# Patient Record
Sex: MALE | Race: WHITE | URBAN - METROPOLITAN AREA
[De-identification: names, ages, dates, MRNs, and addresses within clinical notes are randomized per-mention and may not be internally consistent; named-entity substitution may affect disease eponyms.]

---

## 2020-11-09 ENCOUNTER — OFFICE VISIT (OUTPATIENT)
Dept: URBAN - METROPOLITAN AREA CLINIC 91 | Facility: CLINIC | Age: 70
End: 2020-11-09
Payer: COMMERCIAL

## 2020-11-09 PROCEDURE — 92012 INTRM OPH EXAM EST PATIENT: CPT | Performed by: SPECIALIST

## 2020-11-09 ASSESSMENT — INTRAOCULAR PRESSURE
OS: 15
OD: 16

## 2020-11-09 NOTE — IMPRESSION/PLAN
Impression: Puckering of macula, right eye: H35.371. Plan: I discussed epiretinal membrane. I will obtain baseline testing at this time. Patient advised to monitor vision closely, and to call our office immediately if change is noted. Continue PF 1% QD OD. RV in 3-4 months DFE with OCTm.

## 2021-01-04 ENCOUNTER — OFFICE VISIT (OUTPATIENT)
Dept: URBAN - METROPOLITAN AREA CLINIC 91 | Facility: CLINIC | Age: 71
End: 2021-01-04
Payer: COMMERCIAL

## 2021-01-04 DIAGNOSIS — R42 DIZZINESS: ICD-10-CM

## 2021-01-04 DIAGNOSIS — H26.493 OTHER SECONDARY CATARACT, BILATERAL: ICD-10-CM

## 2021-01-04 PROCEDURE — 99213 OFFICE O/P EST LOW 20 MIN: CPT | Performed by: SPECIALIST

## 2021-01-04 ASSESSMENT — INTRAOCULAR PRESSURE
OS: 16
OD: 17

## 2021-01-04 NOTE — IMPRESSION/PLAN
Impression: Dizziness: R42. Plan: Discussed with patient that he is 20/20, no sign of diplopia and recommended to be refracted again. Will consider to refer pt Neuro Opthalmology, pt has requested to wait as of now. No surgery recommended at this time.

## 2021-01-04 NOTE — IMPRESSION/PLAN
Impression: Puckering of macula, right eye: H35.371. Plan: I discussed epiretinal membrane  OD. I will obtain baseline testing at this time. Patient advised to monitor vision closely, and to call our office immediately if change is noted. Return with Dr. Yessy Lin in 3 months for a follow up and OCTm with Dr. Yessy Lin.

## 2021-02-16 ENCOUNTER — OFFICE VISIT (OUTPATIENT)
Dept: URBAN - METROPOLITAN AREA CLINIC 91 | Facility: CLINIC | Age: 71
End: 2021-02-16
Payer: COMMERCIAL

## 2021-02-16 DIAGNOSIS — H25.12 AGE-RELATED NUCLEAR CATARACT, LEFT EYE: ICD-10-CM

## 2021-02-16 PROCEDURE — 99213 OFFICE O/P EST LOW 20 MIN: CPT | Performed by: SPECIALIST

## 2021-02-16 ASSESSMENT — INTRAOCULAR PRESSURE
OS: 15
OD: 11

## 2021-02-16 NOTE — IMPRESSION/PLAN
Impression: Puckering of macula, right eye: H35.371. Plan: I discussed epiretinal membrane right eye. I will obtain baseline testing at this time. Patient advised to monitor vision closely, and to call our office immediately if change is noted. RV In 6 months short follow up with OCTm.

## 2021-05-03 ENCOUNTER — OFFICE VISIT (OUTPATIENT)
Dept: URBAN - METROPOLITAN AREA CLINIC 91 | Facility: CLINIC | Age: 71
End: 2021-05-03
Payer: COMMERCIAL

## 2021-05-03 PROCEDURE — 99212 OFFICE O/P EST SF 10 MIN: CPT | Performed by: SPECIALIST

## 2021-05-03 PROCEDURE — 92134 CPTRZ OPH DX IMG PST SGM RTA: CPT | Performed by: SPECIALIST

## 2021-05-03 ASSESSMENT — INTRAOCULAR PRESSURE
OS: 14
OD: 14

## 2021-05-03 NOTE — IMPRESSION/PLAN
Impression: Puckering of macula, right eye: H35.371. Plan: OCTm stable. Patient is followed regularly by a retina specialist. 

I discussed epiretinal membrane OD. I will obtain baseline testing at this time. Patient advised to monitor vision closely, and to call our office immediately if change is noted.

## 2021-09-13 ENCOUNTER — OFFICE VISIT (OUTPATIENT)
Dept: URBAN - METROPOLITAN AREA CLINIC 91 | Facility: CLINIC | Age: 71
End: 2021-09-13
Payer: COMMERCIAL

## 2021-09-13 PROCEDURE — 99213 OFFICE O/P EST LOW 20 MIN: CPT | Performed by: SPECIALIST

## 2021-09-13 PROCEDURE — 92134 CPTRZ OPH DX IMG PST SGM RTA: CPT | Performed by: SPECIALIST

## 2021-09-13 ASSESSMENT — INTRAOCULAR PRESSURE
OD: 13
OS: 12

## 2021-09-13 NOTE — IMPRESSION/PLAN
Impression: Puckering of macula, right eye: H35.371. Plan: PT is doing well. Stable. Will continue to monitor.

## 2021-12-13 ENCOUNTER — OFFICE VISIT (OUTPATIENT)
Dept: URBAN - METROPOLITAN AREA CLINIC 91 | Facility: CLINIC | Age: 71
End: 2021-12-13
Payer: COMMERCIAL

## 2021-12-13 DIAGNOSIS — E11.3291 DIABETES MELLITUS TYPE 2 WITH MILD NON-PROLIFERATIVE RETINOPATHY WITHOUT MACULAR EDEMA, RIGHT EYE: ICD-10-CM

## 2021-12-13 DIAGNOSIS — H35.371 PUCKERING OF MACULA, RIGHT EYE: Primary | ICD-10-CM

## 2021-12-13 PROCEDURE — 92134 CPTRZ OPH DX IMG PST SGM RTA: CPT | Performed by: SPECIALIST

## 2021-12-13 PROCEDURE — 99214 OFFICE O/P EST MOD 30 MIN: CPT | Performed by: SPECIALIST

## 2021-12-13 ASSESSMENT — VISUAL ACUITY: OD: 20/80

## 2021-12-13 ASSESSMENT — INTRAOCULAR PRESSURE
OD: 15
OS: 14

## 2021-12-13 NOTE — IMPRESSION/PLAN
Impression: Puckering of macula, right eye: H35.371. Plan: OCTm reviewed with stable ERM OD I discussed epiretinal membrane. I will obtain baseline testing at this time. Patient advised to monitor vision closely, and to call our office immediately if change is noted.

## 2021-12-13 NOTE — IMPRESSION/PLAN
Impression: Diabetes mellitus Type 2 with mild non-proliferative retinopathy without macular edema, right eye: B19.7399. Plan: OCTm reviewed and discussed dot blot heme OD I have explained to patient that there is evidence of mild diabetic retinopathy OD. I have stressed the importance of patient maintaining good blood sugar control, and patient understands the need for close follow-up. Patient will return for regular follow-up appointments with repeat dilation. 

Will refer to Dr. Robbin Oh, an established patient to be seen within 3 days

## 2022-02-07 ENCOUNTER — OFFICE VISIT (OUTPATIENT)
Dept: URBAN - METROPOLITAN AREA CLINIC 91 | Facility: CLINIC | Age: 72
End: 2022-02-07
Payer: COMMERCIAL

## 2022-02-07 DIAGNOSIS — H34.8112 CENTRAL RETINAL VEIN OCCLUSION, RIGHT EYE, STABLE: ICD-10-CM

## 2022-02-07 PROCEDURE — 99213 OFFICE O/P EST LOW 20 MIN: CPT | Performed by: SPECIALIST

## 2022-02-07 PROCEDURE — 92134 CPTRZ OPH DX IMG PST SGM RTA: CPT | Performed by: SPECIALIST

## 2022-02-07 ASSESSMENT — INTRAOCULAR PRESSURE
OS: 15
OD: 13

## 2022-02-07 NOTE — IMPRESSION/PLAN
Impression: Central retinal vein occlusion, right eye, stable: H34.8112. Plan: CRVO OD, Macular edema has improve, Patient following up closely with retina specialist/ s/p injections PRP OS. OCTm WNL.

## 2022-02-07 NOTE — IMPRESSION/PLAN
Impression: Puckering of macula, right eye: H35.371. Plan: I discussed epiretinal membrane right eye. I will obtain baseline testing at this time. Patient advised to monitor vision closely, and to call our office immediately if change is noted. OCTm obtained and reviewed WNL.

## 2022-02-07 NOTE — IMPRESSION/PLAN
Impression: Age-related nuclear cataract, left eye: H25.12. Plan: Due to the fact that cataract is not affecting patient's vision in left eye, I would not recommend surgical intervention at this time. Patient was advised to consider using UVB sunglasses when outdoors. We will consider cataract surgery at later time if patient's visual function no longer meets their needs or interferes with their daily activities.

## 2022-03-21 ENCOUNTER — OFFICE VISIT (OUTPATIENT)
Dept: URBAN - METROPOLITAN AREA CLINIC 91 | Facility: CLINIC | Age: 72
End: 2022-03-21
Payer: COMMERCIAL

## 2022-03-21 DIAGNOSIS — H26.491 OTHER SECONDARY CATARACT, RIGHT EYE: ICD-10-CM

## 2022-03-21 DIAGNOSIS — H04.123 DRY EYE SYNDROME OF BILATERAL LACRIMAL GLANDS: ICD-10-CM

## 2022-03-21 PROCEDURE — 99213 OFFICE O/P EST LOW 20 MIN: CPT | Performed by: SPECIALIST

## 2022-03-21 ASSESSMENT — INTRAOCULAR PRESSURE
OS: 14
OD: 13

## 2022-03-21 NOTE — IMPRESSION/PLAN
Impression: Central retinal vein occlusion, right eye, stable: H34.8112. Plan: Pt. saw [facility] retina speialist who has recommended additional treatment. Pt. discussed with Retina consultant and they are agreeable to the new regimen.

## 2022-05-09 ENCOUNTER — OFFICE VISIT (OUTPATIENT)
Dept: URBAN - METROPOLITAN AREA CLINIC 91 | Facility: CLINIC | Age: 72
End: 2022-05-09
Payer: COMMERCIAL

## 2022-05-09 DIAGNOSIS — H25.12 AGE-RELATED NUCLEAR CATARACT, LEFT EYE: ICD-10-CM

## 2022-05-09 DIAGNOSIS — H04.123 DRY EYE SYNDROME OF BILATERAL LACRIMAL GLANDS: ICD-10-CM

## 2022-05-09 DIAGNOSIS — H34.8112 CENTRAL RETINAL VEIN OCCLUSION, RIGHT EYE, STABLE: Primary | ICD-10-CM

## 2022-05-09 PROCEDURE — 92134 CPTRZ OPH DX IMG PST SGM RTA: CPT | Performed by: SPECIALIST

## 2022-05-09 PROCEDURE — 99214 OFFICE O/P EST MOD 30 MIN: CPT | Performed by: SPECIALIST

## 2022-05-09 ASSESSMENT — INTRAOCULAR PRESSURE
OS: 14
OD: 15

## 2022-05-09 ASSESSMENT — VISUAL ACUITY: OD: 20/400

## 2022-05-09 NOTE — IMPRESSION/PLAN
Impression: Central retinal vein occlusion, right eye, stable: H34.8112. Plan: Pt. saw [facility] retina speialist who has recommended additional treatment. Pt. discussed with Retina consultant and they are agreeable to the new regimen.

## 2022-10-25 ENCOUNTER — OFFICE VISIT (OUTPATIENT)
Dept: URBAN - METROPOLITAN AREA CLINIC 91 | Facility: CLINIC | Age: 72
End: 2022-10-25
Payer: COMMERCIAL

## 2022-10-25 DIAGNOSIS — H26.491 OTHER SECONDARY CATARACT, RIGHT EYE: ICD-10-CM

## 2022-10-25 DIAGNOSIS — H34.8112 CENTRAL RETINAL VEIN OCCLUSION, RIGHT EYE, STABLE: Primary | ICD-10-CM

## 2022-10-25 DIAGNOSIS — H25.12 AGE-RELATED NUCLEAR CATARACT, LEFT EYE: ICD-10-CM

## 2022-10-25 DIAGNOSIS — H35.371 PUCKERING OF MACULA, RIGHT EYE: ICD-10-CM

## 2022-10-25 PROCEDURE — 99214 OFFICE O/P EST MOD 30 MIN: CPT | Performed by: SPECIALIST

## 2022-10-25 PROCEDURE — 92134 CPTRZ OPH DX IMG PST SGM RTA: CPT | Performed by: SPECIALIST

## 2022-10-25 ASSESSMENT — VISUAL ACUITY: OS: 20/25

## 2022-10-25 ASSESSMENT — INTRAOCULAR PRESSURE
OD: 13
OS: 17

## 2022-10-25 NOTE — IMPRESSION/PLAN
Impression: Central retinal vein occlusion, right eye, stable: H34.8112. Plan: CRVO stable OD, patient following closely with retina specialist. OCTm order and reviewed with patient. Will continue to monitor.

## 2022-10-25 NOTE — IMPRESSION/PLAN
Impression: Age-related nuclear cataract, left eye: H25.12. Plan: Due to the fact that cataract is not affecting patient's vision in right eye, I would not recommend surgical intervention at this time. Patient was advised to consider using UVB sunglasses when outdoors. We will consider cataract surgery at later time if patient's visual function no longer meets their needs or interferes with their daily activities.

## 2022-10-25 NOTE — IMPRESSION/PLAN
Impression: Puckering of macula, right eye: H35.371. Plan: I discussed epiretinal membrane right eye. I will obtain baseline testing at this time. Patient advised to monitor vision closely, and to call our office immediately if change is noted.  Patient following closely with retina specialist.

## 2023-04-03 ENCOUNTER — OFFICE VISIT (OUTPATIENT)
Dept: URBAN - METROPOLITAN AREA CLINIC 91 | Facility: CLINIC | Age: 73
End: 2023-04-03
Payer: COMMERCIAL

## 2023-04-03 DIAGNOSIS — H34.8112 CENTRAL RETINAL VEIN OCCLUSION, RIGHT EYE, STABLE: Primary | ICD-10-CM

## 2023-04-03 DIAGNOSIS — H26.491 OTHER SECONDARY CATARACT, RIGHT EYE: ICD-10-CM

## 2023-04-03 DIAGNOSIS — H31.093 OTHER CHORIORETINAL SCARS, BILATERAL: ICD-10-CM

## 2023-04-03 PROCEDURE — 99213 OFFICE O/P EST LOW 20 MIN: CPT | Performed by: SPECIALIST

## 2023-04-03 PROCEDURE — 92250 FUNDUS PHOTOGRAPHY W/I&R: CPT | Performed by: SPECIALIST

## 2023-04-03 ASSESSMENT — INTRAOCULAR PRESSURE
OS: 17
OD: 15

## 2023-04-03 NOTE — IMPRESSION/PLAN
Impression: Central retinal vein occlusion, right eye, stable: H34.8112. Plan: Discussed CRVO OD, patient following closely with retina specialist, report reviewed and scanned. Patient to follow retina regime at this time. Will continue to observe.

## 2023-04-03 NOTE — IMPRESSION/PLAN
Impression: Other chorioretinal scars, bilateral: H31.093. Plan: Discussed chorioretinal scarring OU, stable.  Patient following closely with retina specialist.

## 2023-04-03 NOTE — IMPRESSION/PLAN
Impression: Other secondary cataract, right eye: H26.491. Plan: Discussed diagnosis with patient. Not recommending yag laser at this time, no retinal edema seen in today's exam, clearance given by retina specialist.

Hx of RD OD.

## 2023-10-11 ENCOUNTER — OFFICE VISIT (OUTPATIENT)
Dept: URBAN - METROPOLITAN AREA CLINIC 91 | Facility: CLINIC | Age: 73
End: 2023-10-11
Payer: COMMERCIAL

## 2023-10-11 DIAGNOSIS — H34.8112 CENTRAL RETINAL VEIN OCCLUSION, RIGHT EYE, STABLE: ICD-10-CM

## 2023-10-11 DIAGNOSIS — H35.371 PUCKERING OF MACULA, RIGHT EYE: ICD-10-CM

## 2023-10-11 DIAGNOSIS — H26.491 OTHER SECONDARY CATARACT, RIGHT EYE: Primary | ICD-10-CM

## 2023-10-11 PROCEDURE — 92134 CPTRZ OPH DX IMG PST SGM RTA: CPT | Performed by: SPECIALIST

## 2023-10-11 PROCEDURE — 99213 OFFICE O/P EST LOW 20 MIN: CPT | Performed by: SPECIALIST

## 2023-10-11 ASSESSMENT — INTRAOCULAR PRESSURE
OS: 15
OD: 14

## 2023-11-30 ENCOUNTER — OFFICE VISIT (OUTPATIENT)
Dept: URBAN - METROPOLITAN AREA CLINIC 91 | Facility: CLINIC | Age: 73
End: 2023-11-30
Payer: COMMERCIAL

## 2023-11-30 DIAGNOSIS — H25.12 AGE-RELATED NUCLEAR CATARACT, LEFT EYE: ICD-10-CM

## 2023-11-30 DIAGNOSIS — H35.371 PUCKERING OF MACULA, RIGHT EYE: Primary | ICD-10-CM

## 2023-11-30 DIAGNOSIS — H04.123 DRY EYE SYNDROME OF BILATERAL LACRIMAL GLANDS: ICD-10-CM

## 2023-11-30 DIAGNOSIS — H26.491 OTHER SECONDARY CATARACT, RIGHT EYE: ICD-10-CM

## 2023-11-30 PROCEDURE — 92134 CPTRZ OPH DX IMG PST SGM RTA: CPT | Performed by: SPECIALIST

## 2023-11-30 PROCEDURE — 99213 OFFICE O/P EST LOW 20 MIN: CPT | Performed by: SPECIALIST

## 2023-11-30 ASSESSMENT — INTRAOCULAR PRESSURE
OD: 16
OS: 17

## 2023-12-05 ENCOUNTER — Encounter (OUTPATIENT)
Facility: LOCATION | Age: 73
End: 2023-12-05
Payer: COMMERCIAL

## 2023-12-05 PROCEDURE — 66821 AFTER CATARACT LASER SURGERY: CPT | Performed by: SPECIALIST

## 2023-12-14 ENCOUNTER — OFFICE VISIT (OUTPATIENT)
Dept: URBAN - METROPOLITAN AREA CLINIC 91 | Facility: CLINIC | Age: 73
End: 2023-12-14
Payer: COMMERCIAL

## 2023-12-14 DIAGNOSIS — H35.033 HYPERTENSIVE RETINOPATHY, BILATERAL: ICD-10-CM

## 2023-12-14 PROCEDURE — 92134 CPTRZ OPH DX IMG PST SGM RTA: CPT | Performed by: OPHTHALMOLOGY

## 2023-12-14 PROCEDURE — 99213 OFFICE O/P EST LOW 20 MIN: CPT | Performed by: OPHTHALMOLOGY

## 2023-12-14 ASSESSMENT — INTRAOCULAR PRESSURE
OS: 15
OD: 16

## 2023-12-20 ENCOUNTER — OFFICE VISIT (OUTPATIENT)
Dept: URBAN - METROPOLITAN AREA CLINIC 91 | Facility: CLINIC | Age: 73
End: 2023-12-20
Payer: COMMERCIAL

## 2023-12-20 DIAGNOSIS — Z48.810 ENCOUNTER FOR SURGICAL AFTERCARE FOLLOWING SURGERY ON A SENSE ORGAN: Primary | ICD-10-CM

## 2023-12-20 PROCEDURE — 99024 POSTOP FOLLOW-UP VISIT: CPT | Performed by: OPHTHALMOLOGY

## 2023-12-20 ASSESSMENT — INTRAOCULAR PRESSURE
OD: 14
OS: 11

## 2024-03-11 ENCOUNTER — OFFICE VISIT (OUTPATIENT)
Dept: URBAN - METROPOLITAN AREA CLINIC 91 | Facility: CLINIC | Age: 74
End: 2024-03-11
Payer: COMMERCIAL

## 2024-03-11 DIAGNOSIS — H25.12 AGE-RELATED NUCLEAR CATARACT, LEFT EYE: ICD-10-CM

## 2024-03-11 DIAGNOSIS — Z98.890 OTHER SPECIFIED POSTPROCEDURAL STATES: ICD-10-CM

## 2024-03-11 DIAGNOSIS — H34.8112 CENTRAL RETINAL VEIN OCCLUSION, RIGHT EYE, STABLE: ICD-10-CM

## 2024-03-11 DIAGNOSIS — H35.371 PUCKERING OF MACULA, RIGHT EYE: Primary | ICD-10-CM

## 2024-03-11 PROCEDURE — 99214 OFFICE O/P EST MOD 30 MIN: CPT | Performed by: SPECIALIST

## 2024-03-11 PROCEDURE — 92134 CPTRZ OPH DX IMG PST SGM RTA: CPT | Performed by: SPECIALIST

## 2024-03-11 ASSESSMENT — VISUAL ACUITY
OD: 20/60
OS: 20/20

## 2024-03-11 ASSESSMENT — INTRAOCULAR PRESSURE
OS: 13
OD: 18

## 2024-09-16 ENCOUNTER — OFFICE VISIT (OUTPATIENT)
Dept: URBAN - METROPOLITAN AREA CLINIC 91 | Facility: CLINIC | Age: 74
End: 2024-09-16
Payer: COMMERCIAL

## 2024-09-16 DIAGNOSIS — H25.12 AGE-RELATED NUCLEAR CATARACT, LEFT EYE: ICD-10-CM

## 2024-09-16 DIAGNOSIS — H34.8112 CENTRAL RETINAL VEIN OCCLUSION, RIGHT EYE, STABLE: ICD-10-CM

## 2024-09-16 DIAGNOSIS — H35.371 PUCKERING OF MACULA, RIGHT EYE: Primary | ICD-10-CM

## 2024-09-16 PROCEDURE — 99213 OFFICE O/P EST LOW 20 MIN: CPT | Performed by: SPECIALIST

## 2024-09-16 PROCEDURE — 92134 CPTRZ OPH DX IMG PST SGM RTA: CPT | Performed by: SPECIALIST

## 2024-09-16 ASSESSMENT — INTRAOCULAR PRESSURE
OD: 13
OS: 12

## 2025-03-24 ENCOUNTER — OFFICE VISIT (OUTPATIENT)
Facility: LOCATION | Age: 75
End: 2025-03-24
Payer: COMMERCIAL

## 2025-03-24 DIAGNOSIS — H35.371 PUCKERING OF MACULA, RIGHT EYE: Primary | ICD-10-CM

## 2025-03-24 DIAGNOSIS — H34.8112 CENTRAL RETINAL VEIN OCCLUSION, RIGHT EYE, STABLE: ICD-10-CM

## 2025-03-24 DIAGNOSIS — H04.123 DRY EYE SYNDROME OF BILATERAL LACRIMAL GLANDS: ICD-10-CM

## 2025-03-24 DIAGNOSIS — H25.12 AGE-RELATED NUCLEAR CATARACT, LEFT EYE: ICD-10-CM

## 2025-03-24 PROCEDURE — 99214 OFFICE O/P EST MOD 30 MIN: CPT | Performed by: SPECIALIST

## 2025-03-24 PROCEDURE — 92134 CPTRZ OPH DX IMG PST SGM RTA: CPT | Performed by: SPECIALIST

## 2025-03-24 ASSESSMENT — INTRAOCULAR PRESSURE
OS: 17
OD: 14